# Patient Record
Sex: MALE | NOT HISPANIC OR LATINO | Employment: FULL TIME | URBAN - METROPOLITAN AREA
[De-identification: names, ages, dates, MRNs, and addresses within clinical notes are randomized per-mention and may not be internally consistent; named-entity substitution may affect disease eponyms.]

---

## 2023-12-14 ENCOUNTER — APPOINTMENT (OUTPATIENT)
Dept: UROLOGY | Facility: HOSPITAL | Age: 38
End: 2023-12-14
Payer: COMMERCIAL

## 2023-12-14 DIAGNOSIS — N46.9 STERILITY MALE: ICD-10-CM

## 2024-01-03 ENCOUNTER — ANCILLARY PROCEDURE (OUTPATIENT)
Dept: ENDOCRINOLOGY | Facility: CLINIC | Age: 39
End: 2024-01-03
Payer: COMMERCIAL

## 2024-01-03 DIAGNOSIS — N46.9 STERILITY MALE: ICD-10-CM

## 2024-01-03 LAB
ABSTINENCE (DAYS): 3.5 DAYS (ref 2–7)
AGGLUTINATION (SEMEN): NO
ANALYZED TIME:: ABNORMAL
ANDROLOGY LAB ID#: ABNORMAL
CLUMPS (SEMEN): NO
COLLECTED COMPLETELY: YES
COLLECTION LOCATION:: ABNORMAL
COLLECTION METHOD:: ABNORMAL
CONCENTRATION CN (POST-WASH): 0 MILL/ML
CONCENTRATION(SEMEN): 0 MILL/ML (ref 15–?)
DEBRIS (SEMEN): YES
LEUKOCYTE (SEMEN): ABNORMAL
NON PROG. MOTILITY (SEMEN): 0 %
NON PROG. MOTILITY CN (POST-WASH): 0 %
PROG. MOTILITY (SEMEN): 0 % (ref 32–?)
PROG. MOTILITY CN (POST-WASH): 0 %
RECEIVED TIME:: ABNORMAL
SEMEN APPEARANCE: NORMAL
SEMEN LIQUEFACTION: NORMAL
SEMEN VISCOSITY: NORMAL
TOTAL MOTILITY (SEMEN): 0 % (ref 40–?)
TOTAL MOTILITY CN (POST-WASH): 0 %
TOTAL NO OF MOTILE (SEMEN): 0 MILL
TOTAL NO OF MOTILE CN (POST-WASH): 0 MILL
TOTAL NO OF RND CELLS (SEMEN): 0.3 MILL (ref ?–5)
TOTAL NO OF SPERM (SEMEN): 0 MILL (ref 39–?)
TOTAL NO OF SPERM CN (POST-WASH): 0 MILL
VOLUME (SEMEN): 7.3 ML (ref 1.5–?)
VOLUME CN (POST-WASH): 0.2 ML

## 2024-01-03 PROCEDURE — 89321 SEMEN ANAL SPERM DETECTION: CPT | Performed by: OBSTETRICS & GYNECOLOGY

## 2024-07-05 ENCOUNTER — OFFICE VISIT (OUTPATIENT)
Dept: UROLOGY | Facility: HOSPITAL | Age: 39
End: 2024-07-05
Payer: COMMERCIAL

## 2024-07-05 ENCOUNTER — LAB (OUTPATIENT)
Dept: LAB | Facility: LAB | Age: 39
End: 2024-07-05
Payer: COMMERCIAL

## 2024-07-05 DIAGNOSIS — Z12.5 SCREENING PSA (PROSTATE SPECIFIC ANTIGEN): ICD-10-CM

## 2024-07-05 DIAGNOSIS — Z12.5 SCREENING PSA (PROSTATE SPECIFIC ANTIGEN): Primary | ICD-10-CM

## 2024-07-05 LAB — PSA SERPL-MCNC: 0.86 NG/ML

## 2024-07-05 PROCEDURE — 84153 ASSAY OF PSA TOTAL: CPT

## 2024-07-05 PROCEDURE — 99204 OFFICE O/P NEW MOD 45 MIN: CPT | Performed by: STUDENT IN AN ORGANIZED HEALTH CARE EDUCATION/TRAINING PROGRAM

## 2024-07-05 PROCEDURE — 36415 COLL VENOUS BLD VENIPUNCTURE: CPT

## 2024-07-05 PROCEDURE — 99214 OFFICE O/P EST MOD 30 MIN: CPT | Performed by: STUDENT IN AN ORGANIZED HEALTH CARE EDUCATION/TRAINING PROGRAM

## 2024-07-05 NOTE — PROGRESS NOTES
"    PCP  Vonda Gutierrez MD         CHIEF COMPLAINT:  PSA check          HISTORY OF PRESENT ILLNESS:  This is a  38 y.o. y.o. who presents with concerns of strong family history of Pca with his uncle at a young age.  Pt denies any LUTs, no UTI, no hematuria, no flank pain, no ED, no sexual dysfunction          Past Medical History  He has no past medical history on file.    Surgical History  He has no past surgical history on file.     Social History  He has no history on file for tobacco use, alcohol use, and drug use.    Family History  No family history on file.     Allergies  Patient has no known allergies.        A comprehensive 10+ review of systems was negative except for: see hpi      General: Well developed, well nourished, alert and cooperative, appears in no acute distress   Eyes: Non-injected conjunctiva, sclera clear, no proptosis   Cardiac: Extremities are warm and well perfused. No edema, cyanosis or pallor   Lungs: Breathing is easy, non-labored. Speaking in clear and complete sentences. Normal diaphragmatic movement   MSK: Ambulatory with steady gait, unassisted   Neuro: Alert and oriented to person, place, and time   Psych: Demonstrates good judgment and reason, without hallucinations, abnormal affect or abnormal behaviors   Skin: No obvious lesions, no rashes       No CVA tenderness bilaterally   No suprapubic pain or discomfort   HAROON is normal            PHYSICAL EXAMINATION:  BP Readings from Last 3 Encounters:   07/25/23 125/79      Wt Readings from Last 3 Encounters:   07/25/23 71.8 kg (158 lb 6 oz)      BMI: Estimated body mass index is 24.08 kg/m² as calculated from the following:    Height as of 7/25/23: 1.727 m (5' 8\").    Weight as of 7/25/23: 71.8 kg (158 lb 6 oz).        IMPRESSION AND PLAN:  Iker Brumfield is a 38 y.o. who presents with a family Hx of Prostate Ca at young age in his uncle. No other urolgic complains.     We had a very long and extensive discussion regarding " screening for prostate cancer.  I explained to the patient the pathophysiology, differential diagnosis, risk factor, associated conditions, and management. We discussed PSA testing today, if normal , we will recheck starting age of 45 as per guidelines.  Patient verbalized understanding and would like to proceed.    Plan  PSA today  Fu at 43 years of age        All questions and concerns were answered and addressed.  The patient expressed understanding and agrees with the plan.     7/5/2024